# Patient Record
Sex: MALE | HISPANIC OR LATINO | URBAN - METROPOLITAN AREA
[De-identification: names, ages, dates, MRNs, and addresses within clinical notes are randomized per-mention and may not be internally consistent; named-entity substitution may affect disease eponyms.]

---

## 2024-02-15 ENCOUNTER — APPOINTMENT (RX ONLY)
Dept: URBAN - METROPOLITAN AREA CLINIC 146 | Facility: CLINIC | Age: 39
Setting detail: DERMATOLOGY
End: 2024-02-15

## 2024-02-15 DIAGNOSIS — L23.9 ALLERGIC CONTACT DERMATITIS, UNSPECIFIED CAUSE: ICD-10-CM | Status: INADEQUATELY CONTROLLED

## 2024-02-15 PROCEDURE — ? PRESCRIPTION MEDICATION MANAGEMENT

## 2024-02-15 PROCEDURE — 99203 OFFICE O/P NEW LOW 30 MIN: CPT

## 2024-02-15 PROCEDURE — ? COUNSELING

## 2024-02-15 PROCEDURE — ? PRESCRIPTION

## 2024-02-15 RX ORDER — TRIAMCINOLONE ACETONIDE 1 MG/G
CREAM TOPICAL
Qty: 80 | Refills: 0 | Status: ERX | COMMUNITY
Start: 2024-02-15

## 2024-02-15 RX ADMIN — TRIAMCINOLONE ACETONIDE: 1 CREAM TOPICAL at 00:00

## 2024-02-15 ASSESSMENT — PAIN INTENSITY VAS: HOW INTENSE IS YOUR PAIN 0 BEING NO PAIN, 10 BEING THE MOST SEVERE PAIN POSSIBLE?: 4/10 PAIN

## 2024-02-15 ASSESSMENT — LOCATION DETAILED DESCRIPTION DERM
LOCATION DETAILED: LEFT INFERIOR LATERAL NECK
LOCATION DETAILED: RIGHT INFERIOR LATERAL NECK

## 2024-02-15 ASSESSMENT — ITCH NUMERIC RATING SCALE: HOW SEVERE IS YOUR ITCHING?: 0

## 2024-02-15 ASSESSMENT — SEVERITY ASSESSMENT 2020: SEVERITY 2020: MODERATE

## 2024-02-15 ASSESSMENT — LOCATION SIMPLE DESCRIPTION DERM
LOCATION SIMPLE: LEFT ANTERIOR NECK
LOCATION SIMPLE: RIGHT ANTERIOR NECK

## 2024-02-15 ASSESSMENT — BSA RASH: BSA RASH: 5

## 2024-02-15 ASSESSMENT — LOCATION ZONE DERM: LOCATION ZONE: NECK

## 2024-02-27 ENCOUNTER — APPOINTMENT (RX ONLY)
Dept: URBAN - METROPOLITAN AREA CLINIC 146 | Facility: CLINIC | Age: 39
Setting detail: DERMATOLOGY
End: 2024-02-27

## 2024-02-27 DIAGNOSIS — L23.9 ALLERGIC CONTACT DERMATITIS, UNSPECIFIED CAUSE: ICD-10-CM | Status: RESOLVING

## 2024-02-27 DIAGNOSIS — L81.0 POSTINFLAMMATORY HYPERPIGMENTATION: ICD-10-CM | Status: INADEQUATELY CONTROLLED

## 2024-02-27 PROCEDURE — ? COUNSELING

## 2024-02-27 PROCEDURE — 99213 OFFICE O/P EST LOW 20 MIN: CPT

## 2024-02-27 PROCEDURE — ? PRESCRIPTION

## 2024-02-27 PROCEDURE — ? PRESCRIPTION MEDICATION MANAGEMENT

## 2024-02-27 RX ORDER — HYDROQUINONE 4 %
CREAM (GRAM) TOPICAL
Qty: 30 | Refills: 1 | Status: ERX | COMMUNITY
Start: 2024-02-27

## 2024-02-27 RX ADMIN — Medication: at 00:00

## 2024-02-27 ASSESSMENT — LOCATION ZONE DERM: LOCATION ZONE: NECK

## 2024-02-27 ASSESSMENT — LOCATION SIMPLE DESCRIPTION DERM
LOCATION SIMPLE: RIGHT ANTERIOR NECK
LOCATION SIMPLE: LEFT ANTERIOR NECK

## 2024-02-27 ASSESSMENT — LOCATION DETAILED DESCRIPTION DERM
LOCATION DETAILED: RIGHT INFERIOR LATERAL NECK
LOCATION DETAILED: RIGHT CLAVICULAR NECK
LOCATION DETAILED: LEFT INFERIOR LATERAL NECK

## 2024-02-27 NOTE — PROCEDURE: PRESCRIPTION MEDICATION MANAGEMENT
Detail Level: Zone
Render In Strict Bullet Format?: No
Initiate Treatment: Hydroquinone 4% cream on areas of darker pigments